# Patient Record
Sex: FEMALE | Race: WHITE | Employment: FULL TIME | URBAN - NONMETROPOLITAN AREA
[De-identification: names, ages, dates, MRNs, and addresses within clinical notes are randomized per-mention and may not be internally consistent; named-entity substitution may affect disease eponyms.]

---

## 2020-05-26 ENCOUNTER — TELEPHONE (OUTPATIENT)
Dept: OBGYN | Age: 25
End: 2020-05-26

## 2020-05-26 NOTE — TELEPHONE ENCOUNTER
Julieta requests that someone return their call. The best time to reach her is Anytime. Pt is requesting a call back from nurse. She has a question about birth control for hormones. Family Hx of  PCOS. Thank you.

## 2020-06-03 ENCOUNTER — TELEMEDICINE (OUTPATIENT)
Dept: OBGYN | Age: 25
End: 2020-06-03
Payer: COMMERCIAL

## 2020-06-03 ENCOUNTER — TELEPHONE (OUTPATIENT)
Dept: OBGYN | Age: 25
End: 2020-06-03

## 2020-06-03 PROCEDURE — G8428 CUR MEDS NOT DOCUMENT: HCPCS | Performed by: ADVANCED PRACTICE MIDWIFE

## 2020-06-03 PROCEDURE — 99214 OFFICE O/P EST MOD 30 MIN: CPT | Performed by: ADVANCED PRACTICE MIDWIFE

## 2020-06-03 RX ORDER — SPIRONOLACTONE 25 MG/1
25 TABLET ORAL DAILY
Qty: 90 TABLET | Refills: 1 | Status: SHIPPED | OUTPATIENT
Start: 2020-06-03

## 2020-06-03 ASSESSMENT — ENCOUNTER SYMPTOMS
ALLERGIC/IMMUNOLOGIC NEGATIVE: 1
RESPIRATORY NEGATIVE: 1
GASTROINTESTINAL NEGATIVE: 1
EYES NEGATIVE: 1
ROS SKIN COMMENTS: CYSTIC ACNE

## 2020-06-03 NOTE — PROGRESS NOTES
[facility]  CNM Office Note  TELEHEALTH EVALUATION -- Audio/Visual (During FCHSU-37 public health emergency)    Nuzhat Barnett is a 23 y.o. female who presents today for her medical conditions/ complaints as noted below. Chief Complaint   Patient presents with    Edema         HPI  Julieta signed on for VV. She is a former patient at [facility]. She reports significant wt gain, facial acne, and swelling. Her last birth control was Nan Monteiro and she did not do well with this. She had her IUD removed by Dr. Fariha Briceno. Her BP has been elevated as well. She \"thinks something is wrong\" and desires evaluation. She reports a sedentary lifestyle that requires travelling and she feels it contributes to her symptoms. She desires an oral contraceptive for birth control. She reports normal regular periods. There is no problem list on file for this patient. No LMP recorded. No obstetric history on file. No past medical history on file. No past surgical history on file. No family history on file. Social History     Tobacco Use    Smoking status: Not on file   Substance Use Topics    Alcohol use: Not on file       Current Outpatient Medications   Medication Sig Dispense Refill    spironolactone (ALDACTONE) 25 MG tablet Take 1 tablet by mouth daily 90 tablet 1     No current facility-administered medications for this visit. Allergies not on file  There were no vitals filed for this visit. There is no height or weight on file to calculate BMI. Review of Systems   Constitutional: Positive for unexpected weight change (weight gain). HENT: Negative. Eyes: Negative. Respiratory: Negative. Cardiovascular: Positive for leg swelling (edema). Gastrointestinal: Negative. Endocrine: Negative. Genitourinary: Negative. Musculoskeletal: Negative. Skin:        Cystic acne   Allergic/Immunologic: Negative. Neurological: Negative. Hematological: Negative.     Psychiatric/Behavioral: Negative. Due to this being a TeleHealth encounter, evaluation of the following organ systems is limited: Vitals/Constitutional/EENT/Resp/CV/GI//MS/Neuro/Skin/Heme-Lymph-Imm. Physical Exam  Constitutional:       Appearance: She is well-developed. She is not diaphoretic. HENT:      Head: Normocephalic and atraumatic. Nose: Nose normal.   Eyes:      Conjunctiva/sclera: Conjunctivae normal.      Pupils: Pupils are equal, round, and reactive to light. Neck:      Musculoskeletal: Normal range of motion and neck supple. Thyroid: No thyromegaly. Trachea: No tracheal deviation. Pulmonary:      Effort: Pulmonary effort is normal. No respiratory distress. Musculoskeletal: Normal range of motion. Comments: Normal ROM for upper and lower extremities. Gait steady. Skin:     General: Skin is dry. Findings: No lesion or rash. Neurological:      Mental Status: She is alert and oriented to person, place, and time. Psychiatric:         Speech: Speech normal.         Behavior: Behavior normal.          Diagnosis Orders   1. Edema, unspecified type  CBC    Comprehensive Metabolic Panel    TSH without Reflex    T4, Free    Insulin Free & Total    Testosterone Free and Total, Non-Male   2. Weight gain  CBC    Comprehensive Metabolic Panel    TSH without Reflex    T4, Free    Insulin Free & Total    Testosterone Free and Total, Non-Male   3. Encounter for initial prescription of contraceptive pills         MEDICATIONS:  Orders Placed This Encounter   Medications    spironolactone (ALDACTONE) 25 MG tablet     Sig: Take 1 tablet by mouth daily     Dispense:  90 tablet     Refill:  1       ORDERS:  Orders Placed This Encounter   Procedures    CBC    Comprehensive Metabolic Panel    TSH without Reflex    T4, Free    Insulin Free & Total    Testosterone Free and Total, Non-Male       PLAN:  1. Wt gain/edema - Basic fasting labs ordered.    2. HTN - I would like her to monitor her BP over

## 2020-06-09 DIAGNOSIS — R60.9 EDEMA, UNSPECIFIED TYPE: ICD-10-CM

## 2020-06-09 DIAGNOSIS — R63.5 WEIGHT GAIN: ICD-10-CM

## 2020-06-09 LAB
ALBUMIN SERPL-MCNC: 4.5 G/DL (ref 3.5–5.2)
ALP BLD-CCNC: 67 U/L (ref 35–104)
ALT SERPL-CCNC: 364 U/L (ref 5–33)
ANION GAP SERPL CALCULATED.3IONS-SCNC: 14 MMOL/L (ref 7–19)
AST SERPL-CCNC: 201 U/L (ref 5–32)
BILIRUB SERPL-MCNC: 1 MG/DL (ref 0.2–1.2)
BUN BLDV-MCNC: 10 MG/DL (ref 6–20)
CALCIUM SERPL-MCNC: 9.6 MG/DL (ref 8.6–10)
CHLORIDE BLD-SCNC: 100 MMOL/L (ref 98–111)
CO2: 26 MMOL/L (ref 22–29)
CREAT SERPL-MCNC: 0.7 MG/DL (ref 0.5–0.9)
GFR NON-AFRICAN AMERICAN: >60
GLUCOSE BLD-MCNC: 82 MG/DL (ref 74–109)
HCT VFR BLD CALC: 46.3 % (ref 37–47)
HEMOGLOBIN: 14.4 G/DL (ref 12–16)
MCH RBC QN AUTO: 27.2 PG (ref 27–31)
MCHC RBC AUTO-ENTMCNC: 31.1 G/DL (ref 33–37)
MCV RBC AUTO: 87.4 FL (ref 81–99)
PDW BLD-RTO: 13.5 % (ref 11.5–14.5)
PLATELET # BLD: 288 K/UL (ref 130–400)
PMV BLD AUTO: 12.4 FL (ref 9.4–12.3)
POTASSIUM SERPL-SCNC: 4.1 MMOL/L (ref 3.5–5)
RBC # BLD: 5.3 M/UL (ref 4.2–5.4)
SODIUM BLD-SCNC: 140 MMOL/L (ref 136–145)
T4 FREE: 1.09 NG/DL (ref 0.93–1.7)
TOTAL PROTEIN: 7.5 G/DL (ref 6.6–8.7)
TSH SERPL DL<=0.05 MIU/L-ACNC: 2.04 UIU/ML (ref 0.27–4.2)
WBC # BLD: 11.1 K/UL (ref 4.8–10.8)

## 2020-06-10 ENCOUNTER — PATIENT MESSAGE (OUTPATIENT)
Dept: OBGYN | Age: 25
End: 2020-06-10

## 2020-06-10 NOTE — TELEPHONE ENCOUNTER
From: Shannan Chandler  To: BUSHRA Kirk CNM  Sent: 6/10/2020 1:19 PM CDT  Subject: Non-Urgent Medical Question    No I've not done blood work with her I called and left her a message for her nurse

## 2020-06-12 LAB
INSULIN FREE: 19 UIU/ML (ref 3–19)
INSULIN: 22 UIU/ML (ref 3–19)

## 2020-06-16 ENCOUNTER — TELEPHONE (OUTPATIENT)
Dept: OBGYN | Age: 25
End: 2020-06-16

## 2020-06-18 LAB
SEX HORMONE BINDING GLOBULIN: 17 NMOL/L (ref 30–135)
TESTOSTERONE FREE: 20.6 PG/ML (ref 0.8–7.4)
TESTOSTERONE, FEMALES/CHILDREN: 90 NG/DL (ref 9–55)

## 2021-08-23 PROCEDURE — U0004 COV-19 TEST NON-CDC HGH THRU: HCPCS | Performed by: NURSE PRACTITIONER

## 2021-08-25 ENCOUNTER — E-VISIT (OUTPATIENT)
Dept: FAMILY MEDICINE CLINIC | Facility: TELEHEALTH | Age: 26
End: 2021-08-25

## 2024-03-14 ENCOUNTER — TELEPHONE (OUTPATIENT)
Dept: OBGYN CLINIC | Age: 29
End: 2024-03-14

## 2024-03-14 NOTE — TELEPHONE ENCOUNTER
New patient called to schedule an appointment with nakia shah for a well woman checkup. Please return patient's call.

## 2024-03-22 ENCOUNTER — TELEPHONE (OUTPATIENT)
Dept: OBGYN CLINIC | Age: 29
End: 2024-03-22

## 2024-03-22 NOTE — TELEPHONE ENCOUNTER
Pt wanting to schedule new pt appt with Manda Encinas for well woman. Please contact pt to discuss, thank you.

## 2024-06-20 ENCOUNTER — TELEMEDICINE (OUTPATIENT)
Dept: FAMILY MEDICINE CLINIC | Facility: TELEHEALTH | Age: 29
End: 2024-06-20
Payer: COMMERCIAL

## 2024-06-20 DIAGNOSIS — J02.9 ACUTE PHARYNGITIS, UNSPECIFIED ETIOLOGY: ICD-10-CM

## 2024-06-20 DIAGNOSIS — U07.1 COVID-19: Primary | ICD-10-CM

## 2024-06-20 RX ORDER — PREDNISONE 20 MG/1
20 TABLET ORAL 2 TIMES DAILY
Qty: 6 TABLET | Refills: 0 | Status: SHIPPED | OUTPATIENT
Start: 2024-06-20 | End: 2024-06-23

## 2024-06-20 RX ORDER — AMOXICILLIN 500 MG/1
500 CAPSULE ORAL 2 TIMES DAILY
Qty: 20 CAPSULE | Refills: 0 | Status: SHIPPED | OUTPATIENT
Start: 2024-06-20 | End: 2024-06-30

## 2024-06-20 NOTE — LETTER
June 20, 2024     Patient: Tracy Michaels   YOB: 1995   Date of Visit: 6/20/2024       To Whom It May Concern:    It is my medical opinion that Tracy Michaels  should be excused from work today and tomorrow .           Sincerely,        JEIMY Mae    CC:   No Recipients

## 2024-06-20 NOTE — PATIENT INSTRUCTIONS
Viral Illness, Adult  Viruses are tiny germs that can get into a person's body and cause illness. There are many different types of viruses. And they cause many types of illness. Viral illnesses can range from mild to severe. They can affect various parts of the body.  Short-term conditions that are caused by a virus include colds and flu (influenza) and stomach viruses. Long-term conditions that are caused by a virus include herpes, shingles, and human immunodeficiency virus (HIV) infection. A few viruses have been linked to certain cancers.  What are the causes?  Many types of viruses can cause illness. Viruses get into cells in your body, multiply, and cause the infected cells to work differently or die. When these cells die, they release more of the virus. When this happens, you get symptoms of the illness and the virus spreads to other cells. If the virus takes over how the cell works, it can cause the cell to divide and grow out of control. This happens when a virus causes cancer.  Different viruses get into the body in different ways. You can get a virus by:  Swallowing food or water that has come in contact with the virus.  Breathing in droplets that have been coughed or sneezed into the air by an infected person.  Touching a surface that has the virus on it and then touching your eyes, nose, or mouth.  Being bitten by an insect or animal that carries the virus.  Having sexual contact with a person who is infected with the virus.  Being exposed to blood or fluids that contain the virus, either through an open cut or during a transfusion.  If a virus enters your body, your body's disease-fighting system (immune system) will try to fight the virus. You may be at higher risk for a viral illness if your immune system is weak.  What are the signs or symptoms?  Symptoms depend on the type of virus and the location of the cells that it gets into. Symptoms can include:  For cold and flu  viruses:  Fever.  Headache.  Sore throat.  Muscle aches.  Stuffy nose (nasal congestion).  Cough.  For stomach (gastrointestinal) viruses:  Fever.  Pain in the abdomen.  Nausea or vomiting.  Diarrhea.  For liver viruses (hepatitis):  Loss of appetite.  Feeling tired.  Skin or the white parts of your eyes turning yellow (jaundice).  For brain and spinal cord viruses:  Fever.  Headache.  Stiff neck.  Nausea and vomiting.  Confusion or being sleepy.  For skin viruses:  Warts.  Itching.  Rash.  For sexually transmitted viruses:  Discharge.  Swelling.  Redness.  Rash.  How is this diagnosed?  This condition may be diagnosed based on one or more of these:  Your symptoms and medical history.  A physical exam.  Tests, such as:  Blood tests.  Tests on a sample of mucus from your lungs (sputum sample).  Tests on a poop (stool) sample.  Tests on a swab of body fluids or a skin sore (lesion).  How is this treated?  Viruses can be hard to treat because they live within cells. Antibiotics do not treat viruses because these medicines do not get inside cells. Treatment for a viral illness may include:  Resting and drinking a lot of fluids.  Medicines to treat symptoms. These can include over-the-counter medicine for pain and fever, medicines for cough or congestion, and medicines for diarrhea.  Antiviral medicines. These medicines are available only for certain types of viruses.  Some viral illnesses can be prevented with vaccinations. A common example is the flu shot.  Follow these instructions at home:  Medicines  Take over-the-counter and prescription medicines only as told by your health care provider.  If you were prescribed an antiviral medicine, take it as told by your provider. Do not stop taking the antiviral even if you start to feel better.  Know when antibiotics are needed and when they are not needed. Antibiotics do not treat viruses. You may get an antibiotic if your provider thinks that you may have, or are at risk  for, a bacterial infection and you have a viral infection.  Do not ask for an antibiotic prescription if you have been diagnosed with a viral illness. Antibiotics will not make your illness go away faster.  Taking antibiotics when they are not needed can lead to antibiotic resistance. When this develops, the medicine no longer works against the bacteria that it normally fights.  General instructions  Drink enough fluids to keep your pee (urine) pale yellow.  Rest as much as possible.  Return to your normal activities as told by your provider. Ask your provider what activities are safe for you.  How is this prevented?  To lower your risk of getting another viral illness:  Wash your hands often with soap and water for at least 20 seconds. If soap and water are not available, use hand .  Avoid touching your nose, eyes, and mouth, especially if you have not washed your hands recently.  If anyone in your household has a viral infection, clean all household surfaces that may have been in contact with the virus. Use soap and hot water. You may also use a commercially prepared, bleach-containing solution.  Stay away from people who are sick with symptoms of a viral infection.  Do not share items such as toothbrushes and water bottles with other people.  Keep your vaccinations up to date. This includes getting a yearly flu shot.  Eat a healthy diet and get plenty of rest.  Contact a health care provider if:  You have symptoms of a viral illness that do not go away.  Your symptoms come back after going away.  Your symptoms get worse.  Get help right away if:  You have trouble breathing.  You have a severe headache or a stiff neck.  You have severe vomiting or pain in your abdomen.  These symptoms may be an emergency. Get help right away. Call 911.  Do not wait to see if the symptoms will go away.  Do not drive yourself to the hospital.  This information is not intended to replace advice given to you by your health  care provider. Make sure you discuss any questions you have with your health care provider.  Document Revised: 01/03/2024 Document Reviewed: 10/18/2023  Elsevier Patient Education © 2024 [employer]

## 2024-06-20 NOTE — PROGRESS NOTES
MARY ANN Michaels is a 29 y.o. female  presents with complaint of severe sore throat. Has had some mild congestion   Last Wednesday woke up with 102 fever (resolved with motrin). Woke up Thursday with fever again. Friday tested positive for Covid.     Denies cough, N/V/D, SOA, CP.     Seen telehealth doc on Monday and was prescribed aspirin but she did not take it. She was wanting treatment for possible strep but was not given any abx. She has continued to have a severe sore throat. She has continued to have a fever up to 101.6 (this morning) and taking motrin. She would like treatment for strep due to feeling similar to strep in the past and continued fever. Previous tonsillectomy but throat is red and neck lymph nodes swollen.    Review of Systems    History reviewed. No pertinent past medical history.    No family history on file.    Social History     Socioeconomic History    Marital status: Single   Tobacco Use    Smoking status: Never    Smokeless tobacco: Never   Vaping Use    Vaping status: Never Used   Substance and Sexual Activity    Alcohol use: Never         There were no vitals taken for this visit.    PHYSICAL EXAM  Physical Exam   Constitutional: She appears well-developed and well-nourished.   HENT:   Head: Normocephalic.   Nose: Nose normal.   Neck: Neck normal appearance.  Pulmonary/Chest: Effort normal.   Lymphadenopathy:   Neck nodes swollen and tender on guided exam   Neurological: She is alert.   Psychiatric: She has a normal mood and affect. Her speech is normal.       Diagnoses and all orders for this visit:    1. COVID-19 (Primary)    2. Acute pharyngitis, unspecified etiology  -     amoxicillin (AMOXIL) 500 MG capsule; Take 1 capsule by mouth 2 (Two) Times a Day for 10 days.  Dispense: 20 capsule; Refill: 0  -     predniSONE (DELTASONE) 20 MG tablet; Take 1 tablet by mouth 2 (Two) Times a Day for 3 days.  Dispense: 6 tablet; Refill: 0      *Discussed with patient sore throat and fever  could still be symptoms of covid, but due to symptoms lasting for a week will treat for possible strep. If no improvement in 48 hours go to UC or ER. She verbalized understanding and is agreeable to POC.     FOLLOW-UP  As discussed during visit with [facility], if symptoms worsen or fail to improve, follow-up with PCP/Urgent Care/Emergency Department.    Patient verbalizes understanding of medications, instructions for treatment and follow-up.    Justine Tamayo, APRN  06/20/2024  09:58 EDT    The use of a video visit has been reviewed with the patient and verbal informed consent has been obtained. Myself and Tracy Michaels participated in this visit. The patient is located in [place], [place], and I am located in [place], [place]. [employer] and [employer] Client were utilized.